# Patient Record
Sex: MALE | Race: OTHER | HISPANIC OR LATINO | URBAN - METROPOLITAN AREA
[De-identification: names, ages, dates, MRNs, and addresses within clinical notes are randomized per-mention and may not be internally consistent; named-entity substitution may affect disease eponyms.]

---

## 2019-08-18 ENCOUNTER — EMERGENCY (EMERGENCY)
Facility: HOSPITAL | Age: 74
LOS: 1 days | Discharge: HOME | End: 2019-08-18
Attending: EMERGENCY MEDICINE | Admitting: EMERGENCY MEDICINE
Payer: MEDICARE

## 2019-08-18 VITALS
HEART RATE: 80 BPM | OXYGEN SATURATION: 95 % | DIASTOLIC BLOOD PRESSURE: 84 MMHG | RESPIRATION RATE: 20 BRPM | TEMPERATURE: 97 F | SYSTOLIC BLOOD PRESSURE: 128 MMHG

## 2019-08-18 PROCEDURE — 99284 EMERGENCY DEPT VISIT MOD MDM: CPT

## 2019-08-18 RX ORDER — FAMOTIDINE 10 MG/ML
20 INJECTION INTRAVENOUS ONCE
Refills: 0 | Status: COMPLETED | OUTPATIENT
Start: 2019-08-18 | End: 2019-08-18

## 2019-08-18 RX ORDER — SODIUM CHLORIDE 9 MG/ML
1000 INJECTION INTRAMUSCULAR; INTRAVENOUS; SUBCUTANEOUS ONCE
Refills: 0 | Status: COMPLETED | OUTPATIENT
Start: 2019-08-18 | End: 2019-08-18

## 2019-08-18 RX ADMIN — SODIUM CHLORIDE 1000 MILLILITER(S): 9 INJECTION INTRAMUSCULAR; INTRAVENOUS; SUBCUTANEOUS at 23:54

## 2019-08-18 RX ADMIN — FAMOTIDINE 100 MILLIGRAM(S): 10 INJECTION INTRAVENOUS at 23:53

## 2019-08-18 NOTE — ED ADULT TRIAGE NOTE - CHIEF COMPLAINT QUOTE
BIBA for allergic rx to unknown substance in chocolate. Patient given 12mg dex and 50mg benadryl IV. Heplock to left ac by medics. Inspiratory wheezing noted. Appears tight. Hives noted to arms, face appears red. No visible lip or tongue swelling.

## 2019-08-18 NOTE — ED PROVIDER NOTE - ENMT, MLM
Airway patent, Nasal mucosa clear. Mouth with normal mucosa. Throat has no vesicles, no oropharyngeal exudates and uvula is midline. Airway patent, Nasal mucosa clear. Mouth with normal mucosa. Throat has no vesicles, no oropharyngeal exudates and uvula is midline.  Dentures in placed.  No oropharyngeal soft tissue edema.

## 2019-08-18 NOTE — ED PROVIDER NOTE - SKIN, MLM
Skin normal color for race, warm, dry and intact. erythematous, raised skin on the neck, arms, slightly present on the face.  Blanching, some presence of hives.  No involvement of the palms and soles.  No mucous membrane involvement.  No skin sloughing, skin desquamation or blistering.

## 2019-08-18 NOTE — ED PROVIDER NOTE - NSFOLLOWUPINSTRUCTIONS_ED_ALL_ED_FT
Take medication as prescribed.      Allergic Reaction    An allergic reaction is an abnormal reaction to a substance (allergen) by the body's defense system. Common allergens include medicines, food, insect bites or stings, and blood products. The body releases certain proteins into the blood that can cause a variety of symptoms such as an itchy rash, wheezing, swelling of the face/lips/tongue/throat, abdominal pain, nausea or vomiting. An allergic reaction is usually treated with medication. If your health care provider prescribed you an epinephrine injection device, make sure to keep it with you at all times.    SEEK IMMEDIATE MEDICAL CARE IF YOU HAVE ANY OF THE FOLLOWING SYMPTOMS: allergic reaction severe enough that required you to use epinephrine, tightness in your chest, swelling around your lips/tongue/throat, abdominal pain, vomiting or diarrhea, or lightheadedness/dizziness. These symptoms may represent a serious problem that is an emergency. Do not wait to see if the symptoms will go away. Use your auto-injector pen or anaphylaxis kit as you have been instructed. Call 911 and do not drive yourself to the hospital.

## 2019-08-18 NOTE — ED PROVIDER NOTE - CLINICAL SUMMARY MEDICAL DECISION MAKING FREE TEXT BOX
74 year old male presents with allergic reaction. Patient was already treated with IV steroids and IV benadryl in the field. On arrival, no vital sign derangements. Visible rash. No sign of distress. Clear lungs. Normal HEENT examination. Pt able to speak in full and clear sentences. IV Pepcid ordered. Will give fluids and continue monitoring. 74 year old male presents with allergic reaction. Patient was already treated with IV steroids and IV benadryl in the field. On arrival, no vital sign derangements. Visible rash. No sign of respiratory distress. Clear lungs. Normal HEENT examination. Pt able to speak in full and clear sentences. IV Pepcid ordered. Fluids ordered and placed on cardiac monitor.

## 2019-08-18 NOTE — ED PROVIDER NOTE - PROGRESS NOTE DETAILS
Given additional IV benadryl with almost complete resolution of symptoms.  Remains hemodynamically stable.  Repeat HEENT, Lung exam unchanged.  Conservative management discussed with the patient in detail.  Close PMD follow up encouraged.  Strict ED return instructions discussed in detail and patient given the opportunity to ask any questions about their discharge diagnosis and instructions

## 2019-08-18 NOTE — ED PROVIDER NOTE - OBJECTIVE STATEMENT
74 year old M brought in by ambulance for evaluation after allergic reaction. As per son, symptoms started approximately two and a half hours ago after eating chocolate. Patient began feeling itchiness in his hands and around neck. Son was concerned because he thought he heard wheezing. No nausea, vomiting, diarrhea. No known allergies. EMS was contacted and patient was treated with 12mg Dexamethazone and 50mg Benadryl IV approximately 50 minutes prior to arrival. 74 year old M brought in by ambulance for evaluation after allergic reaction. As per son, symptoms started approximately two and a half hours ago after eating chocolate. Patient began feeling itchiness in his hands and around neck. Son was concerned because he thought he heard wheezing. No nausea, vomiting, diarrhea. No known allergies. EMS was contacted and patient was treated with 12mg Dexamethazone and 50mg Benadryl IV approximately 50 minutes prior to arrival.  Denies allergies to medications.

## 2019-08-19 VITALS
DIASTOLIC BLOOD PRESSURE: 70 MMHG | HEART RATE: 74 BPM | OXYGEN SATURATION: 95 % | RESPIRATION RATE: 16 BRPM | SYSTOLIC BLOOD PRESSURE: 138 MMHG | TEMPERATURE: 98 F

## 2019-08-19 RX ORDER — DIPHENHYDRAMINE HCL 50 MG
50 CAPSULE ORAL ONCE
Refills: 0 | Status: COMPLETED | OUTPATIENT
Start: 2019-08-19 | End: 2019-08-19

## 2019-08-19 RX ORDER — HYDROXYZINE HCL 10 MG
1 TABLET ORAL
Qty: 12 | Refills: 0
Start: 2019-08-19 | End: 2019-08-21

## 2019-08-19 RX ADMIN — Medication 50 MILLIGRAM(S): at 00:53

## 2019-08-20 ENCOUNTER — EMERGENCY (EMERGENCY)
Facility: HOSPITAL | Age: 74
LOS: 1 days | Discharge: HOME | End: 2019-08-20
Attending: EMERGENCY MEDICINE | Admitting: EMERGENCY MEDICINE
Payer: MEDICARE

## 2019-08-20 VITALS
DIASTOLIC BLOOD PRESSURE: 72 MMHG | RESPIRATION RATE: 15 BRPM | OXYGEN SATURATION: 95 % | SYSTOLIC BLOOD PRESSURE: 137 MMHG | TEMPERATURE: 99 F | HEART RATE: 68 BPM

## 2019-08-20 VITALS
RESPIRATION RATE: 16 BRPM | HEART RATE: 71 BPM | OXYGEN SATURATION: 97 % | TEMPERATURE: 99 F | DIASTOLIC BLOOD PRESSURE: 71 MMHG | SYSTOLIC BLOOD PRESSURE: 132 MMHG

## 2019-08-20 PROCEDURE — 99282 EMERGENCY DEPT VISIT SF MDM: CPT

## 2019-08-20 NOTE — ED ADULT NURSE NOTE - CHIEF COMPLAINT QUOTE
patient walk in needing zoster vaccine Shingrix "I was told only a doctor could give this"; given vaccine from [facility] pharmacy and does not want to go to PMD; no other medical complaints; seen here last night for allergic reaction after eating chocolate; [place] speaking only

## 2019-08-20 NOTE — ED ADULT NURSE NOTE - OBJECTIVE STATEMENT
no complaints, to er for administration of shingles vaccine (pt's own) administered w/o difficulty, monitored for 20 minutes w/o adverse reaction noted

## 2019-08-20 NOTE — ED PROVIDER NOTE - OBJECTIVE STATEMENT
73 y/o M with no PMHx presents to the ED requesting a Shingrix vaccine. Pt asked to be administered the vaccine at the ED. He denies any other acute medical complaints.     Pt is aware dose and expiration is listed on the D/C paperwork. 73 y/o M with no PMHx presents to the ED requesting a Shingrix vaccine. Pt asked to be administered the vaccine at the ED. He denies any other acute medical complaints.     Pt is aware dose and expiration is listed on the D/C paperwork and to follow up with another dose in 2-6 months.

## 2019-08-20 NOTE — ED PROVIDER NOTE - NSFOLLOWUPINSTRUCTIONS_ED_ALL_ED_FT
Please return to your doctor for another dose of the Shingrix vaccine.  You must return to them in 2 to 6 months.  You received a dose of Shingrix today. Please return to your doctor for another dose of the Shingrix vaccine.  You must return to them in 2 to 6 months.  You received a dose of Shingrix today.    LOT [id number]  [facility] [id number]  EXP 11/13/21   [id number]

## 2019-08-20 NOTE — ED ADULT TRIAGE NOTE - CHIEF COMPLAINT QUOTE
patient walk in needing zoster vaccine Shingrix "I was told only a doctor could give this"; given vaccine from [facility] pharmacy and does not want to go to PMD; no other medical complaints; seen here last night for allergic reaction after eating chocolate; Beninese speaking only

## 2019-08-24 DIAGNOSIS — L29.9 PRURITUS, UNSPECIFIED: ICD-10-CM

## 2019-08-24 DIAGNOSIS — L50.0 ALLERGIC URTICARIA: ICD-10-CM

## 2019-08-24 DIAGNOSIS — Z23 ENCOUNTER FOR IMMUNIZATION: ICD-10-CM

## 2022-08-23 ENCOUNTER — EMERGENCY (EMERGENCY)
Facility: HOSPITAL | Age: 77
LOS: 1 days | Discharge: HOME | End: 2022-08-23
Attending: EMERGENCY MEDICINE | Admitting: EMERGENCY MEDICINE

## 2022-08-23 VITALS
SYSTOLIC BLOOD PRESSURE: 181 MMHG | TEMPERATURE: 98 F | HEART RATE: 78 BPM | DIASTOLIC BLOOD PRESSURE: 86 MMHG | RESPIRATION RATE: 18 BRPM | OXYGEN SATURATION: 95 % | WEIGHT: 190.04 LBS

## 2022-08-23 DIAGNOSIS — I10 ESSENTIAL (PRIMARY) HYPERTENSION: ICD-10-CM
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
## 2022-08-23 DIAGNOSIS — S05.12XA CONTUSION OF EYEBALL AND ORBITAL TISSUES, LEFT EYE, INITIAL ENCOUNTER: ICD-10-CM
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
## 2022-08-23 DIAGNOSIS — R22.0 LOCALIZED SWELLING, MASS AND LUMP, HEAD: ICD-10-CM

## 2022-08-23 PROCEDURE — 99284 EMERGENCY DEPT VISIT MOD MDM: CPT
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
## 2022-08-23 NOTE — ED ADULT TRIAGE NOTE - MODE OF ARRIVAL
EMS The types of intraocular lenses were reviewed with the patient along with a discussion of their various strengths and weaknesses. Ambulance

## 2022-08-24 PROBLEM — Z78.9 OTHER SPECIFIED HEALTH STATUS: Chronic | Status: ACTIVE | Noted: 2019-08-20

## 2022-08-24 PROCEDURE — 70486 CT MAXILLOFACIAL W/O DYE: CPT | Mod: 26

## 2022-08-24 PROCEDURE — 70450 CT HEAD/BRAIN W/O DYE: CPT | Mod: 26

## 2022-08-24 RX ORDER — IBUPROFEN 200 MG
600 TABLET ORAL ONCE
Refills: 0 | Status: COMPLETED | OUTPATIENT
Start: 2022-08-24 | End: 2022-08-24

## 2022-08-24 NOTE — ED PROVIDER NOTE - PROGRESS NOTE DETAILS
spoke to patient and son, he will relay message to patient that scans are negative. he was with his father in the ED, and they eloped secondary to patient wanting to leave. left message with patient's cell phone. at time of visit, patient was with son, and son was present during exam and interview.
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
## 2022-08-24 NOTE — ED PROVIDER NOTE - PATIENT PORTAL LINK FT
You can access the FollowMyHealth Patient Portal offered by [facility] by registering at the following website: [email or web address]. By joining [facility]’s FollowMyHealth portal, you will also be able to view your health information using other applications (apps) compatible with our system.
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
## 2022-08-24 NOTE — ED PROVIDER NOTE - ENMT, MLM
Airway patent, Nasal mucosa clear. Mouth with normal mucosa. Throat has no vesicles, no oropharyngeal exudates and uvula is midline. No trismus

## 2022-08-24 NOTE — ED PROVIDER NOTE - EYES, MLM
Clear bilaterally, pupils equal, round and reactive to light. Vision 20/20 bilaterally, no proptosis EOMI

## 2022-08-24 NOTE — ED PROVIDER NOTE - SKIN TEMP
Scant ecchymosis in the inferior left periorbital region, no abrasions no lacerations no deformities/warm

## 2024-08-30 NOTE — ED ADULT NURSE NOTE - NSFALLRSKOUTCOME_ED_ALL_ED
Blood pressure acceptable  Continue home dose amlodipine, metoprolol   Universal Safety Interventions